# Patient Record
Sex: MALE | Race: ASIAN | NOT HISPANIC OR LATINO | Employment: UNEMPLOYED | URBAN - METROPOLITAN AREA
[De-identification: names, ages, dates, MRNs, and addresses within clinical notes are randomized per-mention and may not be internally consistent; named-entity substitution may affect disease eponyms.]

---
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
## 2021-06-11 NOTE — PROGRESS NOTES
Assessment/Plan:        Diagnoses and all orders for this visit:    Hydronephrosis, unspecified hydronephrosis type  -     CT Abdomen Pelvis Without Oral Without IV Contrast; Future; Expected date: 7/11/17  -     Calcium Oxalate Stone Prevention Education    Urinary calculus, unspecified  -     Urinalysis Macroscopic      Stone Management Plan  [facility] Stone Management 7/11/2017   Urinary Tract Infection No suspicion of infection   Renal Colic Asymptomatic at this time   Renal Failure No suspicion of renal failure   Current CT date 7/11/2017   Right sided stones? No   R Stone Event No current event   Left sided stones? No   L Stone Event No current event         Subjective:      HPI  Mr. Alejandro Aguirre is a 17 y.o. Hmong male presenting to the [facility] after recent Veyo's ED visit for left flank pain.    He has no prior history of stone disease. He has no identified modifiable stone risk factors. He has identified non-modifiable stone risks including:  early age at first stone.    Primary symptom at presentation was acute onset, constant, sharp left flank pain x few hours. The pain was unprovoked, reaching 10/10 at its worst. It radiated to the left side and abdomen. He denies similar pain events in the past. Significant associated symptoms at presentation included:  nausea, vomiting and dysuria, all resolved. He states he had some mild left flank pain this morning but is asymptomatic at present. He denies symptoms of fever, chills, flank pain, nausea, vomiting, urinary frequency and dysuria.     Given inconclusive renal ultrasound from recent ED visit, he was sent down for same day imaging. CT scan from today is personally reviewed and demonstrates mild left hydronephrosis with no identified ureteral or renal stones.    Significant labs from presentation include severe hematuria, no, mild pyuria, negative nitrite, no bacteria, no growth on urine culture, normal WBC, normal creatinine and normal  potassium.    PLAN    16 yo H[place] M with recent acute left flank pain and associated mild left hydronephrosis, currently asymptomatic.     Probable that Alejandro had a recent stone which has passed in the interval. If so, he should have no recurrent pain or symptoms as hydronephrosis expected to resolve.    Based on review of findings with the patient, he will follow up on a prn basis.  Conservative dietary stone prevention measures reviewed with patient. Patient verbalized understanding. Patient agrees with plan as discussed.     Patient also seen and examined by SHAY Brady   Review of Systems  A 12 point comprehensive review of systems is negative except for HPI    Past Medical History:   Diagnosis Date     Kidney stone        Past Surgical History:   Procedure Laterality Date     NO PAST SURGERIES         Current Outpatient Prescriptions   Medication Sig Dispense Refill     dimenhyDRINATE (DRAMAMINE) 50 MG tablet Take 1 tablet (50 mg total) by mouth every 6 (six) hours. Pain and nausea 30 tablet 0     ibuprofen (ADVIL,MOTRIN) 600 MG tablet Take 1 tablet (600 mg total) by mouth every 6 (six) hours as needed for pain. 30 tablet 0     ondansetron (ZOFRAN ODT) 4 MG disintegrating tablet Take 1 tablet (4 mg total) by mouth every 6 (six) hours as needed for nausea. 20 tablet 0     oxyCODONE-acetaminophen (PERCOCET) 5-325 mg per tablet Take 1 tablet by mouth every 6 (six) hours as needed. 20 tablet 0     senna-docusate (PERICOLACE) 8.6-50 mg tablet Take 1 tablet by mouth 2 (two) times a day. 30 tablet 0     No current facility-administered medications for this visit.        No Known Allergies    Social History     Social History     Marital status: Single     Spouse name: N/A     Number of children: N/A     Years of education: N/A     Occupational History      at [employer]      Social History Main Topics     Smoking status: Never Smoker     Smokeless tobacco: Not on file     Alcohol use No     Drug  use: Not on file     Sexual activity: Not on file     Other Topics Concern     Not on file     Social History Narrative    07/09/17: Pt speak english as a second language       Family History   Problem Relation Age of Onset     Diabetes Mother      Kidney disease Mother      Urolithiasis Neg Hx      Clotting disorder Neg Hx      Gout Neg Hx      Cancer Neg Hx      Heart disease Neg Hx        Objective:      Physical Exam  Vitals:    07/11/17 1127   BP: 128/65   Pulse: 79   Temp: 97.9  F (36.6  C)     General - well developed, well nourished, appropriate for age. Appears no distress at this time   Heart - regular rate and rhythm, no murmur  Respiratory - normal effort, clear to auscultation, good air entry without adventitious noises  Abdomen - slender soft, non-tender, no hepatosplenomegaly, no masses.   - no flank tenderness, no suprapubic tenderness, kidney and bladder non-palpable  MSK - normal spinal curvature. no spinal tenderness. normal gait. muscular strength intact.  Neurology - cranial nerves II-XII grossly intact, normal sensation, no unsteadiness  Skin - intact, no bruising, no gouty tophi  Psych - oriented to time, place, and person, normal mood and affect.    Labs  Urinalysis POC (Office):  Nitrite, UA   Date Value Ref Range Status   07/11/2017 Negative Negative Final   07/09/2017 Negative Negative Final       Lab Urinalysis:  Blood, UA   Date Value Ref Range Status   07/11/2017 Large (!) Negative Final   07/09/2017 Large (!) Negative Final     Nitrite, UA   Date Value Ref Range Status   07/11/2017 Negative Negative Final   07/09/2017 Negative Negative Final     Leukocytes, UA   Date Value Ref Range Status   07/11/2017 Small (!) Negative Final   07/09/2017 Negative Negative Final     pH, UA   Date Value Ref Range Status   07/11/2017 5.5 4.5 - 8.0 Final   07/09/2017 6.0 4.5 - 8.0 Final    and Acute Labs   CBC   WBC   Date Value Ref Range Status   07/09/2017 11.8 4.5 - 13.0 thou/uL Final     Hemoglobin    Date Value Ref Range Status   07/09/2017 14.8 13.0 - 16.0 g/dL Final     Platelets   Date Value Ref Range Status   07/09/2017 206 140 - 440 thou/uL Final   , Renal Panel  [facility]  Creatinine   Date Value Ref Range Status   07/09/2017 1.07 0.70 - 1.30 mg/dL Final     Potassium   Date Value Ref Range Status   07/09/2017 3.4 (L) 3.5 - 5.0 mmol/L Final     Calcium   Date Value Ref Range Status   07/09/2017 9.0 8.5 - 10.5 mg/dL Final    and Urine Culture    Culture   Date Value Ref Range Status   07/09/2017 No Growth  Final

## 2021-06-20 ENCOUNTER — HOSPITAL ENCOUNTER (EMERGENCY)
Dept: EMERGENCY MEDICINE | Facility: HOSPITAL | Age: 21
Discharge: HOME | End: 2021-06-20
Attending: EMERGENCY MEDICINE

## 2021-06-20 DIAGNOSIS — U07.0 E-CIGARETTE OR VAPING PRODUCT USE ASSOCIATED LUNG INJURY (EVALI): ICD-10-CM

## 2021-06-20 DIAGNOSIS — J20.9 ACUTE BRONCHITIS, UNSPECIFIED ORGANISM: ICD-10-CM

## 2021-06-20 LAB
ANION GAP SERPL CALCULATED.3IONS-SCNC: 16 MMOL/L (ref 5–18)
BASOPHILS # BLD AUTO: 0.1 THOU/UL (ref 0–0.2)
BASOPHILS NFR BLD AUTO: 1 % (ref 0–2)
BUN SERPL-MCNC: 14 MG/DL (ref 8–22)
CALCIUM SERPL-MCNC: 10.1 MG/DL (ref 8.5–10.5)
CHLORIDE BLD-SCNC: 98 MMOL/L (ref 98–107)
CO2 SERPL-SCNC: 22 MMOL/L (ref 22–31)
CREAT SERPL-MCNC: 1.1 MG/DL (ref 0.7–1.3)
D DIMER PPP FEU-MCNC: 0.94 FEU UG/ML
EOSINOPHIL # BLD AUTO: 1 THOU/UL (ref 0–0.4)
EOSINOPHIL NFR BLD AUTO: 11 % (ref 0–6)
ERYTHROCYTE [DISTWIDTH] IN BLOOD BY AUTOMATED COUNT: 11.6 % (ref 11–14.5)
GFR SERPL CREATININE-BSD FRML MDRD: >60 ML/MIN/1.73M2
GLUCOSE BLD-MCNC: 90 MG/DL (ref 70–125)
HCT VFR BLD AUTO: 44.6 % (ref 40–54)
HGB BLD-MCNC: 15.5 G/DL (ref 14–18)
IMM GRANULOCYTES # BLD: 0 THOU/UL
IMM GRANULOCYTES NFR BLD: 0 %
LYMPHOCYTES # BLD AUTO: 1.7 THOU/UL (ref 0.8–4.4)
LYMPHOCYTES NFR BLD AUTO: 18 % (ref 20–40)
MCH RBC QN AUTO: 30.4 PG (ref 27–34)
MCHC RBC AUTO-ENTMCNC: 34.8 G/DL (ref 32–36)
MCV RBC AUTO: 88 FL (ref 80–100)
MONOCYTES # BLD AUTO: 1 THOU/UL (ref 0–0.9)
MONOCYTES NFR BLD AUTO: 10 % (ref 2–10)
NEUTROPHILS # BLD AUTO: 5.5 THOU/UL (ref 2–7.7)
NEUTROPHILS NFR BLD AUTO: 59 % (ref 50–70)
PLATELET # BLD AUTO: 336 THOU/UL (ref 140–440)
PMV BLD AUTO: 9.9 FL (ref 8.5–12.5)
POTASSIUM BLD-SCNC: 3.6 MMOL/L (ref 3.5–5)
RBC # BLD AUTO: 5.1 MILL/UL (ref 4.4–6.2)
SARS-COV-2 PCR RESULT-HE - HISTORICAL: NEGATIVE
SODIUM SERPL-SCNC: 136 MMOL/L (ref 136–145)
TROPONIN I SERPL-MCNC: <0.01 NG/ML (ref 0–0.29)
WBC: 9.2 THOU/UL (ref 4–11)

## 2021-06-20 RX ORDER — PREDNISONE 10 MG/1
40 TABLET ORAL DAILY
Qty: 4 TABLET | Refills: 0 | Status: SHIPPED | OUTPATIENT
Start: 2021-06-21 | End: 2021-07-20

## 2021-06-20 ASSESSMENT — MIFFLIN-ST. JEOR: SCORE: 1603.21

## 2021-06-21 ENCOUNTER — COMMUNICATION - HEALTHEAST (OUTPATIENT)
Dept: SCHEDULING | Facility: CLINIC | Age: 21
End: 2021-06-21

## 2021-06-22 ENCOUNTER — COMMUNICATION - HEALTHEAST (OUTPATIENT)
Dept: SCHEDULING | Facility: CLINIC | Age: 21
End: 2021-06-22

## 2021-06-22 LAB
ATRIAL RATE - MUSE: 113 BPM
DIASTOLIC BLOOD PRESSURE - MUSE: NORMAL
INTERPRETATION ECG - MUSE: NORMAL
P AXIS - MUSE: NORMAL
PR INTERVAL - MUSE: 128 MS
QRS DURATION - MUSE: 88 MS
QT - MUSE: 330 MS
QTC - MUSE: 452 MS
R AXIS - MUSE: 85 DEGREES
SYSTOLIC BLOOD PRESSURE - MUSE: NORMAL
T AXIS - MUSE: 40 DEGREES
VENTRICULAR RATE- MUSE: 113 BPM

## 2021-06-26 NOTE — ED PROVIDER NOTES
EMERGENCY DEPARTMENT ENCOUNTER      NAME: Alejandro gAuirre  AGE: 21 y.o. male  YOB: 2000  MRN: [id number]  EVALUATION DATE & TIME: 6/20/2021  1:40 PM    PCP: Provider, No Primary Care    ED PROVIDER: Martha Glasgow M.D.      CHIEF COMPLAINT     Chief Complaint   Patient presents with     Cough         FINAL IMPRESSION:     1. Acute bronchitis, unspecified organism    2. E-cigarette or vaping product use associated lung injury (EVALI)          MEDICAL DECISION MAKING:       Pertinent Labs & Imaging studies reviewed. (See chart for details)    21 y.o. male presents to the Emergency Department for evaluation of cough, chest pain, shortness of breath.    ED Course as of Jun 20 1744   Sun Jun 20, 2021   1406 21-year-old male with no previous past medical history presents complaining of chest pain cough and shortness of breath.  Symptoms started a week ago.  She cough is white sputum no hemoptysis.  He coughed hard today and felt like the pain went to the left back.  So an episode of vomiting no hematemesis.    [LP]   1406 Denies recent sick contacts or recent travel.  He is a smoker.    [LP]   1406 On exam well-appearing in no respiratory distress cardiac is tachycardic pulmonary diffuse expiratory wheezes abdomen is soft on the lower extremity edema.    [LP]   1407 Differential diagnoses include but not limited to pneumonia,, pneumonitis, PE, congestive heart failure, acute coronary syndrome, pneumothorax among others.    [LP]   1407 Given the wheezes will do albuterol.  Will obtain basic labs D-dimer.  No risk factors for PE.  Will obtain Covid    [LP]   1534 Still faint expiratory wheezes.  No hypoxic.  Improvement of the tachycardia.  D-dimer remains elevated will order a CT chest.  Patient in agreement    [LP]   1721 CT shows no PE but diffuse inflammation concerning for vaping.  Patient does in fact vape.  Plan to start him on MDI albuterol inhaler every 4 hours, prednisone, doxycycline and follow-up with  pulmonology.  Patient also regarding the dangers of vaping    [LP]   1727 Spoke with Dr. Mendes who agrees with doxycycline 5-day course of prednisone albuterol quitting vaping and follow the pulmonology clinic.  Patient discharged ambulatory stable condition.    [LP]      ED Course User Index  [LP] Martha Glasgow MD         Differential Diagnosis (include but not limited to)  PE pneumonia pneumothorax bronchitis acute coronary syndrome Covid among others      Vital Signs: tachycardia  EKG: Sinus tachycardia  Imaging:ct PE bronchitis lung related injury  Home Meds: Reviewed  ED meds/abx: Butyryl prednisone doxycycline  Fluids: 1 l NS    Labs  K 3.6  Cr 1.10  Wbc 9.2  Hgb 15.5  Platelets 336  ddimer 0.94  Troponin negative         Review of Previous Records  2/26/2019  Patient had a visit with family medicine on 2/26/19 for exposure to chlamydia. He was prescribed 1-day of 1000 mg azithromycin.      Consults  Pulmonology - Dr. Mendes    ED COURSE   1:46PM I met with the patient for the initial interview and physical examination. Discussed plan for treatment and workup in the ED. PPE: Provider wore gloves, goggles, surgical cap, N95 mask, and surgical mask.   3:33 PM I reassessed the patient and updated him on the results.    5:15 PM I reassessed the patient and updated him on the results. He disclosed that he vapes. I discussed the plan for discharge with the patient, and patient is agreeable. We discussed supportive cares at home and reasons for return to the ER including new or worsening symptoms - all questions and concerns addressed. Patient to be discharged by RN.  5:24 PM I discussed the case with Dr. Mendes, pulmonologist.      At the conclusion of the encounter I discussed the results of all of the tests and the disposition. The questions were answered. The patient acknowledged understanding and was agreeable with the care plan.           MEDICATIONS GIVEN IN THE EMERGENCY:     Medications   albuterol  inhaler 2 puff (PROAIR HFA;PROVENTIL HFA;VENTOLIN HFA) (2 puffs Inhalation Given 6/20/21 1456)   sodium chloride 0.9% 1,000 mL (0 mL Intravenous Stopped 6/20/21 1711)   iopamidol solution 100 mL (ISOVUE-370) (100 mL Intravenous Given 6/20/21 1500)   doxycycline capsule 100 mg (MONODOX) (100 mg Oral Given 6/20/21 1735)   predniSONE tablet 40 mg (DELTASONE) (40 mg Oral Given 6/20/21 1735)       NEW PRESCRIPTIONS STARTED AT TODAY'S ER VISIT     Current Discharge Medication List      START taking these medications    Details   doxycycline (VIBRAMYCIN) 100 MG capsule Take 1 capsule (100 mg total) by mouth 2 (two) times a day for 7 days.  Qty: 14 capsule, Refills: 0    Associated Diagnoses: Acute bronchitis, unspecified organism; E-cigarette or vaping product use associated lung injury (EVALI)      predniSONE (DELTASONE) 10 mg tablet Take 40 mg by mouth daily.  Qty: 4 tablet, Refills: 0    Comments: Starting tomorrow.  Associated Diagnoses: Acute bronchitis, unspecified organism; E-cigarette or vaping product use associated lung injury (EVALI)         CONTINUE these medications which have NOT CHANGED    Details   dimenhyDRINATE (DRAMAMINE) 50 MG tablet Take 1 tablet (50 mg total) by mouth every 6 (six) hours. Pain and nausea  Qty: 30 tablet, Refills: 0                =================================================================    HPI     Patient information was obtained from: patient    Use of : N/A         Alejandro Aguirre is a 21 y.o. male with no pertinent history who presents by EMS for evaluation of cough.    Patient reports 1 week of a productive cough with clear sputum, no hemoptysis. Also reports chest pain and shortness of breath onset 5 days ago. Chest pain is constant, located on the left side of the chest, and is provoked with coughing and movement. No palliating factors. States that he currently has the chest pain. Notes that he coughed hard earlier today and pain felt like it radiated to his back.  No known history of asthma.    Adds that he had a couple episodes of vomiting this week, but none today. No hematemesis.    Also reports that his urine has been an orange color. No dysuria.    He is otherwise healthy and denies abdominal pain, hematemesis, diarrhea, leg swelling, rashes, or any other complaints at this time. No known Covid-19 exposure. He is not vaccinated for Covid-19.      SHx: Patient smokes and uses alcohol. Denies marijuana or illicit drug use. In a later encounter, he disclosed that he vapes.    REVIEW OF SYSTEMS   Review of Systems   Respiratory: Positive for cough (productive clear sputum) and shortness of breath.         Negative for hemoptysis.   Cardiovascular: Positive for chest pain. Negative for leg swelling.   Gastrointestinal: Positive for nausea (since resolved) and vomiting (since resolved). Negative for abdominal pain and diarrhea.        Negative for hematemesis.   Genitourinary: Negative for dysuria.        Positive for darker urine (orange).   Skin: Negative for rash.   All other systems reviewed and are negative.       PAST MEDICAL HISTORY:     Past Medical History:   Diagnosis Date     Kidney stone        PAST SURGICAL HISTORY:     Past Surgical History:   Procedure Laterality Date     NO PAST SURGERIES           CURRENT MEDICATIONS:     No current facility-administered medications on file prior to encounter.      Current Outpatient Medications on File Prior to Encounter   Medication Sig     dimenhyDRINATE (DRAMAMINE) 50 MG tablet Take 1 tablet (50 mg total) by mouth every 6 (six) hours. Pain and nausea       ALLERGIES:   No Known Allergies    FAMILY HISTORY:     Family History   Problem Relation Age of Onset     Diabetes Mother      Kidney disease Mother      Urolithiasis Neg Hx      Clotting disorder Neg Hx      Gout Neg Hx      Cancer Neg Hx      Heart disease Neg Hx        SOCIAL HISTORY:     Social History     Socioeconomic History     Marital status: Single     Spouse  "name: None     Number of children: None     Years of education: None     Highest education level: None   Occupational History     Occupation:  at [employer]   Social Needs     Financial resource strain: None     Food insecurity     Worry: None     Inability: None     Transportation needs     Medical: None     Non-medical: None   Tobacco Use     Smoking status: Never Smoker   Substance and Sexual Activity     Alcohol use: No     Drug use: None     Sexual activity: None   Lifestyle     Physical activity     Days per week: None     Minutes per session: None     Stress: None   Relationships     Social connections     Talks on phone: None     Gets together: None     Attends Christianity service: None     Active member of club or organization: None     Attends meetings of clubs or organizations: None     Relationship status: None     Intimate partner violence     Fear of current or ex partner: None     Emotionally abused: None     Physically abused: None     Forced sexual activity: None   Other Topics Concern     None   Social History Narrative    07/09/17: Pt speak english as a second language       VITALS:   /74   Pulse 89   Temp 99.1  F (37.3  C) (Oral)   Resp 16   Ht 5' 3\" (1.6 m)   Wt 155 lb (70.3 kg)   SpO2 93%   BMI 27.46 kg/m      PHYSICAL EXAM     Physical Exam   Constitutional: He is oriented to person, place, and time. He appears well-developed and well-nourished. No distress.   HENT:   Head: Normocephalic and atraumatic.   Right Ear: External ear normal.   Left Ear: External ear normal.   Eyes: Conjunctivae and EOM are normal.   Neck: Neck supple.   Cardiovascular: Regular rhythm, normal heart sounds and intact distal pulses.   Tachycardic   Pulmonary/Chest: No stridor. He has wheezes. He has rales.   Abdominal: Soft. Bowel sounds are normal.   Musculoskeletal: Normal range of motion.         General: No edema.   Neurological: He is alert and oriented to person, place, and time.   Skin: Skin is " warm and dry. He is not diaphoretic.   Psychiatric: He has a normal mood and affect. His behavior is normal. Thought content normal.   Nursing note and vitals reviewed.      Physical Exam   Constitutional: well appearing, cooperative and pleasant. No acute distress.    Head: Atraumatic.     Nose: Nose normal.     Mouth/Throat: Oropharynx is clear and moist.     Eyes: EOM are normal. Pupils are equal, round, and reactive to light.     Ears: Pearly white TMs bilaterally.    Neck: Normal range of motion. Neck supple.     Cardiovascular: tachycardic rate and rhythm, normal heart sounds.      Pulmonary/Chest: Normal effort, diffuse expiratory wheezes.    Abdominal: Soft. Bowel sounds are normal.    Musculoskeletal: Normal range of motion.     Neurological: No deficits    Lymphatics: No lower extremity edema.    Skin: Skin is warm and dry.     Psychiatric: Normal mood and affect. Behavior is normal.       LAB:     All pertinent labs reviewed and interpreted.  Results for orders placed or performed during the hospital encounter of 06/20/21   Basic Metabolic Panel   Result Value Ref Range    Sodium 136 136 - 145 mmol/L    Potassium 3.6 3.5 - 5.0 mmol/L    Chloride 98 98 - 107 mmol/L    CO2 22 22 - 31 mmol/L    Anion Gap, Calculation 16 5 - 18 mmol/L    Glucose 90 70 - 125 mg/dL    Calcium 10.1 8.5 - 10.5 mg/dL    BUN 14 8 - 22 mg/dL    Creatinine 1.10 0.70 - 1.30 mg/dL    GFR MDRD Af Amer >60 >60 mL/min/1.73m2    GFR MDRD Non Af Amer >60 >60 mL/min/1.73m2   D-dimer, Quantitative   Result Value Ref Range    D-Dimer, Quant 0.94 (H) <=0.50 FEU ug/mL   Troponin I   Result Value Ref Range    Troponin I <0.01 0.00 - 0.29 ng/mL   Symptomatic SARS-CoV-2 (COVID-19)-PCR    Specimen: Respiratory   Result Value Ref Range    SARS-CoV-2 PCR Result Negative Negative, Invalid   HM1 (CBC with Diff)   Result Value Ref Range    WBC 9.2 4.0 - 11.0 thou/uL    RBC 5.10 4.40 - 6.20 mill/uL    Hemoglobin 15.5 14.0 - 18.0 g/dL    Hematocrit 44.6  40.0 - 54.0 %    MCV 88 80 - 100 fL    MCH 30.4 27.0 - 34.0 pg    MCHC 34.8 32.0 - 36.0 g/dL    RDW 11.6 11.0 - 14.5 %    Platelets 336 140 - 440 thou/uL    MPV 9.9 8.5 - 12.5 fL    Neutrophils % 59 50 - 70 %    Lymphocytes % 18 (L) 20 - 40 %    Monocytes % 10 2 - 10 %    Eosinophils % 11 (H) 0 - 6 %    Basophils % 1 0 - 2 %    Immature Granulocyte % 0 <=0 %    Neutrophils Absolute 5.5 2.0 - 7.7 thou/uL    Lymphocytes Absolute 1.7 0.8 - 4.4 thou/uL    Monocytes Absolute 1.0 (H) 0.0 - 0.9 thou/uL    Eosinophils Absolute 1.0 (H) 0.0 - 0.4 thou/uL    Basophils Absolute 0.1 0.0 - 0.2 thou/uL    Immature Granulocyte Absolute 0.0 <=0.0 thou/uL       RADIOLOGY:     Reviewed all pertinent imaging. Please see official radiology report.  Cta Chest Pe Run    Result Date: 6/20/2021  EXAM: CTA CHEST PE RUN LOCATION: [facility] DATE/TIME: 6/20/2021 4:02 PM INDICATION: Shortness of breath, pleuritic left chest pain, cough and elevated d-dimer. COMPARISON: None. TECHNIQUE: CT chest pulmonary angiogram during arterial phase injection of IV contrast. Multiplanar reformats and MIP reconstructions were performed. Dose reduction techniques were used. CONTRAST: Iopamidol (Isovue-370) 100mL FINDINGS: ANGIOGRAM CHEST: Pulmonary arteries are normal caliber and negative for pulmonary emboli. Normal thoracic aorta. LUNGS AND PLEURA: Moderate to marked uniform endobronchial mucosal thickening throughout both lungs with significant endobronchial narrowing distal subsegmental bronchi and branching opacities in the periphery of both lungs compatible with bronchial and bronchiolar inflammation. Several foci of peripheral consolidation and volume loss and mild reactive thickening of the adjacent pleura and left upper lobe anteriorly, left lower lobe posteriorly and right lower lobe posteriorly that are probably related to narrowing of the subtending subsegmental bronchi. MEDIASTINUM/AXILLAE: Heart size normal. No  pericardial effusion. Small mediastinal and hilar lymph nodes without lymphadenopathy. CORONARY ARTERY CALCIFICATION: None. UPPER ABDOMEN: Normal. MUSCULOSKELETAL: Normal.     1.  No pulmonary emboli. 2.  Bronchial and bronchiolar inflammation. Several foci of peripheral consolidation and volume loss with mild reactive thickening of the adjacent pleura likely related to narrowing of the subtending subsegmental bronchi. Findings are nonspecific most commonly secondary to asthma or viral infection with intentional (vaping related lung injury) or unintentional inhalational etiologies also possible. Consider nonurgent pulmonary consult given degree of bronchial inflammation.      EKG:     EKG #1  Sinus tachycardia normal axis normal anterior R wave progression    Time:[id number]    Ventricular rate 113 bmp  Axis normal  GA interval 128 ms  QRS duration 88 ms  QT/[id number]/452  ms    Compared to previous EKG on no previous EKGs have a level for comparison.  I have independently reviewed and interpreted the EKG(s) documented above.      PROCEDURES:     Procedures      I, Michelle Fontaine, am serving as a scribe to document services personally performed by Dr. Glasgow based on my observation and the provider's statements to me. I, Martha Glasgow MD attest that Michelle Fontaine is acting in a scribe capacity, has observed my performance of the services and has documented them in accordance with my direction.    Martha Glasgow M.D.  Emergency Medicine  [facility] EMERGENCY DEPARTMENT  [address]  [facility] [address]  Dept: [phone]  Loc: [phone]     Martha Glasgow MD  06/20/21 9360

## 2021-07-06 VITALS — BODY MASS INDEX: 27.46 KG/M2 | WEIGHT: 155 LBS | HEIGHT: 63 IN

## 2021-07-20 ENCOUNTER — OFFICE VISIT (OUTPATIENT)
Dept: PULMONOLOGY | Facility: OTHER | Age: 21
End: 2021-07-20
Attending: EMERGENCY MEDICINE
Payer: COMMERCIAL

## 2021-07-20 VITALS
OXYGEN SATURATION: 93 % | HEART RATE: 89 BPM | HEIGHT: 63 IN | BODY MASS INDEX: 26.08 KG/M2 | SYSTOLIC BLOOD PRESSURE: 114 MMHG | DIASTOLIC BLOOD PRESSURE: 80 MMHG | WEIGHT: 147.2 LBS

## 2021-07-20 DIAGNOSIS — J18.1 CONSOLIDATION LUNG (H): ICD-10-CM

## 2021-07-20 DIAGNOSIS — J40 BRONCHITIS: Primary | ICD-10-CM

## 2021-07-20 PROCEDURE — 99204 OFFICE O/P NEW MOD 45 MIN: CPT | Performed by: INTERNAL MEDICINE

## 2021-07-20 RX ORDER — FLUTICASONE PROPIONATE 110 UG/1
1 AEROSOL, METERED RESPIRATORY (INHALATION) 2 TIMES DAILY
Qty: 12 G | Refills: 3 | Status: SHIPPED | OUTPATIENT
Start: 2021-07-20

## 2021-07-20 RX ORDER — ALBUTEROL SULFATE 90 UG/1
2 AEROSOL, METERED RESPIRATORY (INHALATION) EVERY 6 HOURS PRN
Qty: 8.5 G | Refills: 4 | Status: SHIPPED | OUTPATIENT
Start: 2021-07-20

## 2021-07-20 ASSESSMENT — MIFFLIN-ST. JEOR: SCORE: 1567.82

## 2021-07-20 NOTE — PATIENT INSTRUCTIONS
Plan:     You must stop smoking since it is causing inflammation in your airways and lungs, contributing to your cough & issues with breathing. Even when we treat you with inhaler, smoking will undermine these efforts.    Start using Flovent inhaler -- 1 puff twice a day, everyday, even if you are feeling fine. This inhaler reduces the inflammation in your airways (this inflammation causes shortness of breath, wheezing, & cough).     After using Flovent rinse & gargle your mouth & throat with water to prevent irritation.     I am sending your another inhaler called ALBUTEROL. I want you to use this inhaler instead of the one you have now. Only use it if your breathing is acutely worsened & you feel that it is a breathing emergency.    I want to do a lung function test to see how your lungs are doing -- make sure that everything is working well.     I want to repeat a CT scan of your lungs to make sure that the inflammation we saw in your lungs last month is improving.    You should follow up in 2 months.     It is possible that your insurance may not cover the prescribed inhalers. If that is the case, we can substitute them with another medication in the same class. Please, call your insurance provider to discuss your options & then our clinic if this is the case. Be prepared to provide us with names of inhalers that your insurance will approve.     If you have any questions or concerns, please, call our clinic at [phone].     It is very important to use good technique when using inhalers. To review correct inhaler technique, consider using [employer] to look for demonstrations. You should exercise common sense when looking for videos -- best and most informative inhaler videos come from health care organizations and/or healthcare providers. Make sure you watch the video for you specific type of inhaler.    For information on eligibility and to schedule an appointment for COVID-19 vaccination, please, go to:  [email or web address]

## 2021-07-20 NOTE — PROGRESS NOTES
"[facility] Outpatient Consultation  7/20/2021     Assessment and Plan:   #. Dyspnea, bronchitis, high likelihood of asthma vs other reactive airway disease worsened by smoking.   #. Pulmonary opacities.  #. Tobacco use disorder.      Start empiric Flovent BID    Start PRN albuterol, advised stopping epinephrine inhaler    Full PFT    Repeat CT chest    Discussed smoking cessation    RTC 2 months.    Darshana Gilmore MD  Pulmonary and Critical Care   ______________________________________________________________________________    CC: cough, dyspnea    HPI:   Alejandro Aguirre is a 21 year old current everyday smoker with history of hydronephrosis, presenting today for evaluation of recent episode of acute bronchitis, for which he was seen in ED 6/20/21. He was discharged from the ED w/ a course of prednisone & doxycycline.     Reports that he is here because he got a call to follow up after his visit to the ED. His dyspnea has resolved but he still has some intermittent issues. His brother got him an epinephrine inhaler that he uses as needed after he carries heavy things or has dust exposure, or smokes \"a lot\". He uses it about 1-2x/day. No nocturnal respiratory issues. Reports daily cough productive of thick white mucus. He reports wheezing that is triggered by physical activity, usually something more strenuous like climbing stairs.     He currently smokes 5 cigarettes/day. He no longer vapes. Denies any childhood respiratory issues. His persistent breathing issues seemed to have started a month ago, but before this he would get intermittent bronchitis issues (the most recent episode was 5th time in his lifetime). His little brother reportedly has asthma.     ROS: 10 point ROS reviewed and noted to be negative w/ exceptions as detailed in the HPI.    Physical Exam:  /80   Pulse 89   Ht 1.6 m (5' 3\")   Wt 66.8 kg (147 lb 3.2 oz)   SpO2 93%   BMI 26.08 kg/m    Gen: alert, oriented, no distress  HEENT: masked, " PERRL; no stridor  CV: RRR, no M/G/R  Resp: equal bilateral air entry, breath sounds w/ intermittent expiratory wheezes throughout, no focal crackles; able to converse in full sentences w/ no respiratory distress; no cough during visit  Abd: soft, nontender, no palpable organomegaly  Skin: no apparent rashes  Ext: no cyanosis, no clubbing, no BLE edema  Neuro: alert, nonfocal    Labs: personally reviewed in the EMR.  Imaging studies: personally reviewed and interpreted. Below are the Radiology interpretations.  CTA chest, 6/20/21:  ANGIOGRAM CHEST: Pulmonary arteries are normal caliber and negative for pulmonary emboli. Normal thoracic aorta.  LUNGS AND PLEURA: Moderate to marked uniform endobronchial mucosal thickening throughout both lungs with significant endobronchial narrowing distal subsegmental bronchi and branching opacities in the periphery of both lungs compatible with bronchial and bronchiolar inflammation. Several foci of peripheral consolidation and volume loss and mild reactive thickening of the adjacent pleura and left upper lobe anteriorly, left lower lobe posteriorly and right lower lobe posteriorly that are probably related to narrowing of the subtending subsegmental bronchi.  MEDIASTINUM/AXILLAE: Heart size normal. No pericardial effusion. Small mediastinal and hilar lymph nodes without lymphadenopathy.  CORONARY ARTERY CALCIFICATION: None.  UPPER ABDOMEN: Normal.  MUSCULOSKELETAL: Normal.         PFT's none.    PMH: hydronephrosis, nephrolithiasis.    PSH:  Past Surgical History:   Procedure Laterality Date     NO PAST SURGERIES         Family HX: family history includes Diabetes in his mother; Kidney Disease in his mother.    Social Hx: vaping     Current Meds:  Current Outpatient Medications   Medication Sig Dispense Refill     dimenhyDRINATE (DRAMAMINE) 50 MG tablet [DIMENHYDRINATE (DRAMAMINE) 50 MG TABLET] Take 1 tablet (50 mg total) by mouth every 6 (six) hours. Pain and nausea 30 tablet 0      predniSONE (DELTASONE) 10 mg tablet [PREDNISONE (DELTASONE) 10 MG TABLET] Take 40 mg by mouth daily. 4 tablet 0       Allergies:  No Known Allergies

## 2021-07-21 ENCOUNTER — HOSPITAL ENCOUNTER (OUTPATIENT)
Dept: CT IMAGING | Facility: HOSPITAL | Age: 21
Discharge: HOME | End: 2021-07-21
Attending: INTERNAL MEDICINE | Admitting: INTERNAL MEDICINE
Payer: COMMERCIAL

## 2021-07-21 DIAGNOSIS — J18.1 CONSOLIDATION LUNG (H): ICD-10-CM

## 2021-07-21 PROCEDURE — 71250 CT THORAX DX C-: CPT
